# Patient Record
Sex: MALE | Race: WHITE
[De-identification: names, ages, dates, MRNs, and addresses within clinical notes are randomized per-mention and may not be internally consistent; named-entity substitution may affect disease eponyms.]

---

## 2017-08-22 ENCOUNTER — HOSPITAL ENCOUNTER (EMERGENCY)
Dept: HOSPITAL 10 - E/R | Age: 77
Discharge: ANOTHER HOSPITAL | End: 2017-08-22
Payer: COMMERCIAL

## 2017-08-22 VITALS
HEIGHT: 67 IN | WEIGHT: 158.31 LBS | BODY MASS INDEX: 24.85 KG/M2 | BODY MASS INDEX: 24.85 KG/M2 | HEIGHT: 67 IN | WEIGHT: 158.31 LBS

## 2017-08-22 VITALS
RESPIRATION RATE: 18 BRPM | HEART RATE: 102 BPM | SYSTOLIC BLOOD PRESSURE: 126 MMHG | DIASTOLIC BLOOD PRESSURE: 81 MMHG | TEMPERATURE: 97.3 F

## 2017-08-22 DIAGNOSIS — Z87.891: ICD-10-CM

## 2017-08-22 DIAGNOSIS — Z79.84: ICD-10-CM

## 2017-08-22 DIAGNOSIS — I10: ICD-10-CM

## 2017-08-22 DIAGNOSIS — Z79.4: ICD-10-CM

## 2017-08-22 DIAGNOSIS — E11.9: ICD-10-CM

## 2017-08-22 DIAGNOSIS — I21.4: Primary | ICD-10-CM

## 2017-08-22 LAB
ALBUMIN SERPL-MCNC: 3.9 G/DL (ref 3.3–4.9)
ALBUMIN/GLOB SERPL: 1.14 {RATIO}
ALP SERPL-CCNC: 84 IU/L (ref 42–121)
ALT SERPL-CCNC: 29 IU/L (ref 13–69)
ANION GAP SERPL CALC-SCNC: 15 MMOL/L (ref 8–16)
APTT BLD: 26.9 SEC (ref 25–35)
AST SERPL-CCNC: 30 IU/L (ref 15–46)
BASOPHILS # BLD AUTO: 0.1 10^3/UL (ref 0–0.1)
BASOPHILS NFR BLD: 0.5 % (ref 0–2)
BILIRUB DIRECT SERPL-MCNC: 0 MG/DL (ref 0–0.2)
BILIRUB SERPL-MCNC: 0.4 MG/DL (ref 0.2–1.3)
BUN SERPL-MCNC: 19 MG/DL (ref 7–20)
CALCIUM SERPL-MCNC: 8.6 MG/DL (ref 8.4–10.2)
CHLORIDE SERPL-SCNC: 108 MMOL/L (ref 97–110)
CO2 SERPL-SCNC: 20 MMOL/L (ref 21–31)
CREAT SERPL-MCNC: 1.01 MG/DL (ref 0.61–1.24)
EOSINOPHIL # BLD: 0.8 10^3/UL (ref 0–0.5)
EOSINOPHIL NFR BLD: 7.5 % (ref 0–7)
ERYTHROCYTE [DISTWIDTH] IN BLOOD BY AUTOMATED COUNT: 13.2 % (ref 11.5–14.5)
GLOBULIN SER-MCNC: 3.4 G/DL (ref 1.3–3.2)
GLUCOSE SERPL-MCNC: 243 MG/DL (ref 70–220)
HCT VFR BLD CALC: 38.7 % (ref 42–52)
HGB BLD-MCNC: 13.2 G/DL (ref 14–18)
INR PPP: 0.94
LYMPHOCYTES # BLD AUTO: 3 10^3/UL (ref 0.8–2.9)
LYMPHOCYTES NFR BLD AUTO: 28.7 % (ref 15–51)
MCH RBC QN AUTO: 32.6 PG (ref 29–33)
MCHC RBC AUTO-ENTMCNC: 34.1 G/DL (ref 32–37)
MCV RBC AUTO: 95.6 FL (ref 82–101)
MONOCYTES # BLD: 0.5 10^3/UL (ref 0.3–0.9)
MONOCYTES NFR BLD: 4.3 % (ref 0–11)
NEUTROPHILS NFR BLD AUTO: 58.7 % (ref 39–77)
NRBC # BLD MANUAL: 0 10^3/UL (ref 0–0)
NRBC BLD AUTO-RTO: 0 /100WBC (ref 0–0)
PLATELET # BLD: 249 10^3/UL (ref 140–415)
PMV BLD AUTO: 12.2 FL (ref 7.4–10.4)
POTASSIUM SERPL-SCNC: 4.2 MMOL/L (ref 3.5–5.1)
PROT SERPL-MCNC: 7.3 G/DL (ref 6.1–8.1)
PROTHROMBIN TIME: 12.6 SEC (ref 12.2–14.2)
PT RATIO: 1
RBC # BLD AUTO: 4.05 10^6/UL (ref 4.7–6.1)
SODIUM SERPL-SCNC: 139 MMOL/L (ref 135–144)
TROPONIN I SERPL-MCNC: 1.06 NG/ML (ref 0–0.12)
WBC # BLD AUTO: 10.5 10^3/UL (ref 4.8–10.8)

## 2017-08-22 PROCEDURE — 85610 PROTHROMBIN TIME: CPT

## 2017-08-22 PROCEDURE — 94664 DEMO&/EVAL PT USE INHALER: CPT

## 2017-08-22 PROCEDURE — 85025 COMPLETE CBC W/AUTO DIFF WBC: CPT

## 2017-08-22 PROCEDURE — 83605 ASSAY OF LACTIC ACID: CPT

## 2017-08-22 PROCEDURE — 87040 BLOOD CULTURE FOR BACTERIA: CPT

## 2017-08-22 PROCEDURE — 85730 THROMBOPLASTIN TIME PARTIAL: CPT

## 2017-08-22 PROCEDURE — 99285 EMERGENCY DEPT VISIT HI MDM: CPT

## 2017-08-22 PROCEDURE — 93005 ELECTROCARDIOGRAM TRACING: CPT

## 2017-08-22 PROCEDURE — 83880 ASSAY OF NATRIURETIC PEPTIDE: CPT

## 2017-08-22 PROCEDURE — 71010: CPT

## 2017-08-22 PROCEDURE — 80053 COMPREHEN METABOLIC PANEL: CPT

## 2017-08-22 PROCEDURE — 84484 ASSAY OF TROPONIN QUANT: CPT

## 2017-08-22 PROCEDURE — 96374 THER/PROPH/DIAG INJ IV PUSH: CPT

## 2017-08-22 NOTE — RADRPT
PROCEDURE:   XR Chest. 

 

CLINICAL INDICATION:   Sepsis 

 

TECHNIQUE:   Portable single view of the chest

 

COMPARISON:   None. 

 

FINDINGS:

There is mild cardiomegaly.  Lung volumes are reduced.  There is pulmonary vascular congestion as we
ll as peribronchial thickening.  Areas of scar or subsegmental atelectasis are seen throughout the l
karson fields bilaterally.  Increased left retrocardiac opacity may be due to cardiomegaly or underlyin
g airspace disease.  No pleural effusion is seen.  Degenerative change of the spine and shoulders.  
Atherosclerotic aorta. 

 

IMPRESSION:

Cardiomegaly.  Slightly increased left retrocardiac opacity may all be due to cardiomegaly although 
underlying airspace disease cannot be completely excluded.  Lateral view could be obtained if indica
damaris clinically. Mild pulmonary vascular congestion and peribronchial thickening.

 

RPTAT: HLBE

_____________________________________________ 

Physician Hossein           Date    Time 

Electronically viewed and signed by Physician Hossein on 08/22/2017 02:56 

 

D:  08/22/2017 02:56  T:  08/22/2017 02:56

CHUY/

## 2017-08-22 NOTE — ERA
ER Documentation


Chief Complaint


Date/Time


DATE: 8/22/17 


TIME: 05:23


Chief Complaint


BIB [facility] FR HOME, C/O SOB COUGH/CONGESTION [id number]





HPI


-year-old male brought in from home with complaints of shortness of breath and 

cough and chest pain.  Patient in by ambulance.  Chest pain is very mild.  

Shortness breath.  Progressively worse over the last 2 days.  No nausea no 

vomiting no chills.  No other current complaints.  Patient is a [facility] patient





ROS


All systems reviewed and are negative except as per history of present illness.





Medications


Home Meds


Reported Medications


Glipizide* (Glipizide*) 5 Mg Tablet, 5 MG PO AC BREAKFAST DINNER, TAB


   8/22/17


Metformin* (Glucophage*) 500 Mg Tab, 500 MG PO WITH BREAKFAST DINNE, #30 TAB


   8/22/17


Discontinued Reported Medications


Insulin Aspart (Novolog) 100 Unit/1 Ml Cartridge, 15 UNIT SQ Q9PM


   8/22/17





Allergies


Allergies:  


Coded Allergies:  


     No Known Allergy (Unverified , 8/22/17)





PMhx/Soc


Medical and Surgical Hx:  pt denies Surgical Hx


History of Surgery:  No


Anesthesia Reaction:  No


Hx Neurological Disorder:  No


Hx Respiratory Disorders:  No


Hx Cardiac Disorders:  Yes (htn, diabetes )


Hx Psychiatric Problems:  No


Hx Miscellaneous Medical Probl:  No


Hx Alcohol Use:  No


Hx Substance Use:  No


Hx Tobacco Use:  No


Smoking Status:  Former smoker





Physical Exam


Vitals





Vital Signs








  Date Time  Temp Pulse Resp B/P Pulse Ox O2 Delivery O2 Flow Rate FiO2


 


8/22/17 05:17 97.3 102 18 126/81 100 Nasal Cannula 2.0 


 


8/22/17 03:37     100  2.0 28


 


8/22/17 03:28  108 17  100 Nasal Cannula 2.0 28


 


8/22/17 03:20 97.3 104 19 106/68 99 Nasal Cannula 2.0 


 


8/22/17 02:14      Nasal Cannula 2 


 


8/22/17 02:14      Nasal Cannula 2.0 


 


8/22/17 02:09 97.3 122 23 111/93 90   








Physical Exam


Const:  []


Head:   Atraumatic 


Eyes:    Normal Conjunctiva


ENT:    Normal External Ears, Nose and Mouth.


Neck:               Full range of motion..~ No meningismus.


Resp:    Clear to auscultation bilaterally


Cardio:    Regular rate and rhythm, no murmurs


Abd:    Soft, non tender, non distended. Normal bowel sounds


Skin:    No petechiae or rashes


Back:    No midline or flank tenderness


Ext:    No cyanosis, or edema


Neur:    Awake and alert


Psych:    Normal Mood and Affect


Result Diagram:  


8/22/17 0224 8/22/17 0224





Results 24 hrs





 Laboratory Tests








Test


  8/22/17


02:24 8/22/17


02:28


 


White Blood Count 10.510^3/ul  


 


Red Blood Count 4.0510^6/ul  


 


Hemoglobin 13.2g/dl  


 


Hematocrit 38.7%  


 


Mean Corpuscular Volume 95.6fl  


 


Mean Corpuscular Hemoglobin 32.6pg  


 


Mean Corpuscular Hemoglobin


Concent 34.1g/dl 


  


 


 


Red Cell Distribution Width 13.2%  


 


Platelet Count 50509^3/UL  


 


Mean Platelet Volume 12.2fl  


 


Neutrophils % 58.7%  


 


Lymphocytes % 28.7%  


 


Monocytes % 4.3%  


 


Eosinophils % 7.5%  


 


Basophils % 0.5%  


 


Nucleated Red Blood Cells % 0.0/100WBC  


 


Neutrophils # (Manual) 610^3/ul  


 


Lymphocytes # 3.010^3/ul  


 


Monocytes # 0.510^3/ul  


 


Eosinophils # 0.810^3/ul  


 


Basophils # 0.110^3/ul  


 


Nucleated Red Blood Cells # 0.010^3/ul  


 


Prothrombin Time 12.6Sec  


 


Prothrombin Time Ratio 1.0  


 


INR International Normalized


Ratio 0.94 


  


 


 


Activated Partial


Thromboplast Time 26.9Sec 


  


 


 


Sodium Level 139mmol/L  


 


Potassium Level 4.2mmol/L  


 


Chloride Level 108mmol/L  


 


Carbon Dioxide Level 20mmol/L  


 


Anion Gap 15  


 


Blood Urea Nitrogen 19mg/dl  


 


Creatinine 1.01mg/dl  


 


Glucose Level 243mg/dl  


 


Calcium Level 8.6mg/dl  


 


Total Bilirubin 0.4mg/dl  


 


Direct Bilirubin 0.00mg/dl  


 


Indirect Bilirubin 0.4mg/dl  


 


Aspartate Amino Transf


(AST/SGOT) 30IU/L 


  


 


 


Alanine Aminotransferase


(ALT/SGPT) 29IU/L 


  


 


 


Alkaline Phosphatase 84IU/L  


 


Troponin I 1.060ng/ml  


 


B-Type Natriuretic Peptide 6310PG/ML  


 


Total Protein 7.3g/dl  


 


Albumin 3.9g/dl  


 


Globulin 3.40g/dl  


 


Albumin/Globulin Ratio 1.14  


 


Lactic Acid Level  1.9mmol/L 








 Current Medications








 Medications


  (Trade)  Dose


 Ordered  Sig/Anabel


 Route


 PRN Reason  Start Time


 Stop Time Status Last Admin


Dose Admin


 


 Albuterol


  (Proventil


 0.083% (Neb))  5 mg  ONCE  STAT


 NEB


   8/22/17 02:51


 8/22/17 02:52 DC 8/22/17 03:28


 


 


 Ipratropium


 Bromide


  (Atrovent 0.02%


  (Neb))  0.5 mg  ONCE  STAT


 NEB


   8/22/17 02:51


 8/22/17 02:52 DC 8/22/17 03:27


 


 


 Aspirin


  (Aspirin)  325 mg  ONCE  ONCE


 PO


   8/22/17 04:00


 8/22/17 04:01 DC 8/22/17 03:53


 


 


 Furosemide


  (Lasix)  40 mg  ONCE  ONCE


 IV


   8/22/17 05:30


 8/22/17 05:31   


 


 


 Nitroglycerin


  (Nitroglycerin


 2% Oint)  1 inch  ONCE  ONCE


 TD


   8/22/17 05:30


 8/22/17 05:31   


 











Procedures/MDM


EKG: 


Rate/Rhythm:             Normal Sinus Rhythm


QRS, ST, T-waves:    No changes consistent w/ acute ischemia


Impression:      No evidence of ischemia or arrhythmia





Chest X-ray 1V Interpreted by me:


Soft Tissue:                                               No acute 

abnormalities


Bones:                                                    No acute abnormalities


Mediastinum/Cardiac Silhouette/Lungs:     Cardiomegaly with increased 

interstitial fluid markings





Patient's symptoms are concerning for cardiac cause will require inpatient 

workup and continuous monitoring.  Further w/u for ischemia, arrhythmia, PE or 

dissection will be deferred to the inpatient team.  Elevation of troponin and 

absence of ST segment elevation myocardial infarction makes is a subacute 

cardiac injury.  Patient will be transferred to [place] for further evaluation 

and management via paramedic transport





Accepting Care Team:


Current data and ongoing care discussed.


Time:                      5 AM


Primary Provider:      Transferred to [place]


Consulting:                  [[email or web address]]


Outstanding Data:       none





Departure


Diagnosis:  


 Primary Impression:  


 Non-STEMI (non-ST elevated myocardial infarction)


Condition:  Stable











CARIE DEVI Aug 22, 2017 05:25

## 2017-09-15 ENCOUNTER — HOSPITAL ENCOUNTER (EMERGENCY)
Dept: HOSPITAL 10 - E/R | Age: 77
Discharge: HOME | End: 2017-09-15
Payer: COMMERCIAL

## 2017-09-15 VITALS
HEIGHT: 67 IN | BODY MASS INDEX: 21.11 KG/M2 | WEIGHT: 134.48 LBS | BODY MASS INDEX: 21.11 KG/M2 | WEIGHT: 134.48 LBS | HEIGHT: 67 IN

## 2017-09-15 VITALS — DIASTOLIC BLOOD PRESSURE: 85 MMHG | SYSTOLIC BLOOD PRESSURE: 129 MMHG | HEART RATE: 70 BPM | RESPIRATION RATE: 16 BRPM

## 2017-09-15 VITALS — TEMPERATURE: 98.1 F

## 2017-09-15 DIAGNOSIS — Z79.84: ICD-10-CM

## 2017-09-15 DIAGNOSIS — D64.9: ICD-10-CM

## 2017-09-15 DIAGNOSIS — R51: Primary | ICD-10-CM

## 2017-09-15 DIAGNOSIS — Z79.01: ICD-10-CM

## 2017-09-15 DIAGNOSIS — E11.9: ICD-10-CM

## 2017-09-15 DIAGNOSIS — E86.0: ICD-10-CM

## 2017-09-15 DIAGNOSIS — I10: ICD-10-CM

## 2017-09-15 DIAGNOSIS — Z79.82: ICD-10-CM

## 2017-09-15 LAB
ANION GAP SERPL CALC-SCNC: 14 MMOL/L (ref 8–16)
APTT BLD: 28.5 SEC (ref 25–35)
BASOPHILS # BLD AUTO: 0 10^3/UL (ref 0–0.1)
BASOPHILS NFR BLD: 0.5 % (ref 0–2)
BUN SERPL-MCNC: 29 MG/DL (ref 7–20)
CALCIUM SERPL-MCNC: 9.5 MG/DL (ref 8.4–10.2)
CHLORIDE SERPL-SCNC: 104 MMOL/L (ref 97–110)
CO2 SERPL-SCNC: 25 MMOL/L (ref 21–31)
CREAT SERPL-MCNC: 1.2 MG/DL (ref 0.61–1.24)
EOSINOPHIL # BLD: 0.4 10^3/UL (ref 0–0.5)
EOSINOPHIL NFR BLD: 6.2 % (ref 0–7)
ERYTHROCYTE [DISTWIDTH] IN BLOOD BY AUTOMATED COUNT: 12.1 % (ref 11.5–14.5)
GLUCOSE SERPL-MCNC: 152 MG/DL (ref 70–220)
HCT VFR BLD CALC: 38.5 % (ref 42–52)
HGB BLD-MCNC: 13 G/DL (ref 14–18)
INR PPP: 0.93
LYMPHOCYTES # BLD AUTO: 1.4 10^3/UL (ref 0.8–2.9)
LYMPHOCYTES NFR BLD AUTO: 22.5 % (ref 15–51)
MCH RBC QN AUTO: 32.3 PG (ref 29–33)
MCHC RBC AUTO-ENTMCNC: 33.8 G/DL (ref 32–37)
MCV RBC AUTO: 95.8 FL (ref 82–101)
MONOCYTES # BLD: 0.6 10^3/UL (ref 0.3–0.9)
MONOCYTES NFR BLD: 8.9 % (ref 0–11)
NEUTROPHILS # BLD: 3.9 10^3/UL (ref 1.6–7.5)
NEUTROPHILS NFR BLD AUTO: 61.7 % (ref 39–77)
NRBC # BLD MANUAL: 0 10^3/UL (ref 0–0)
NRBC BLD AUTO-RTO: 0 /100WBC (ref 0–0)
PLATELET # BLD: 191 10^3/UL (ref 140–415)
PMV BLD AUTO: 11.2 FL (ref 7.4–10.4)
POTASSIUM SERPL-SCNC: 4.5 MMOL/L (ref 3.5–5.1)
PROTHROMBIN TIME: 12.5 SEC (ref 12.2–14.2)
PT RATIO: 1
RBC # BLD AUTO: 4.02 10^6/UL (ref 4.7–6.1)
SODIUM SERPL-SCNC: 138 MMOL/L (ref 135–144)
WBC # BLD AUTO: 6.3 10^3/UL (ref 4.8–10.8)

## 2017-09-15 PROCEDURE — 85610 PROTHROMBIN TIME: CPT

## 2017-09-15 PROCEDURE — 80048 BASIC METABOLIC PNL TOTAL CA: CPT

## 2017-09-15 PROCEDURE — 85025 COMPLETE CBC W/AUTO DIFF WBC: CPT

## 2017-09-15 PROCEDURE — 99285 EMERGENCY DEPT VISIT HI MDM: CPT

## 2017-09-15 PROCEDURE — 70450 CT HEAD/BRAIN W/O DYE: CPT

## 2017-09-15 PROCEDURE — 36415 COLL VENOUS BLD VENIPUNCTURE: CPT

## 2017-09-15 PROCEDURE — 85730 THROMBOPLASTIN TIME PARTIAL: CPT

## 2017-09-15 NOTE — ERA
ER Documentation


Chief Complaint


Date/Time


DATE: 9/15/17


Chief Complaint


BIB [facility] FROM HOME FOR HEADACHE AND DIZZINESS X 1 DAY, PT CANNOT SLEEP





HPI


The patient is a 77-year-old male, presenting with headache, dizziness 

intermittently for 2 weeks, worse tonight.  He denies vertigo, he has similar 

symptoms previously, denies fever, chills, neck pain, chest pain, abdominal pain

, vomiting, dysuria, diarrhea.  He does not smoke nor drink, has a lot of 

stress in his life, lives alone





Past medical history: Hypertension, diabetes mellitus, dyslipidemia, depression





Past surgical history: None





ROS


All systems reviewed and are negative except as per history of present illness.





Medications


Home Meds


Reported Medications


Bisoprolol Fumarate* (Bisoprolol Fumarate*) 5 Mg Tablet, 5 MG PO DAILY, TAB


   9/15/17


Lisinopril* (Lisinopril*) 5 Mg Tablet, 5 MG PO DAILY, #30 TAB


   9/15/17


Aspirin* (Aspirin* EC) 81 Mg Tablet.dr, 81 MG PO DAILY, TAB


   9/15/17


Clopidogrel Bisulfate* (Clopidogrel Bisulfate*) 75 Mg Tablet, 75 MG PO DAILY, #

30 TAB


   9/15/17


Furosemide* (Furosemide*) 40 Mg Tablet, 40 MG PO DAILY, TAB


   9/15/17


Atorvastatin* (Atorvastatin*) 40 Mg Tablet, 40 MG PO QPM, #30 TAB


   9/15/17


Discontinued Reported Medications


Glipizide* (Glipizide*) 5 Mg Tablet, 5 MG PO AC BREAKFAST DINNER, TAB


   17


Metformin* (Glucophage*) 500 Mg Tab, 500 MG PO WITH BREAKFAST DINNE, #30 TAB


   17





Allergies


Allergies:  


Coded Allergies:  


     No Known Allergy (Unverified , 9/15/17)





PMhx/Soc


History of Surgery:  No


Anesthesia Reaction:  No


Hx Neurological Disorder:  No


Hx Respiratory Disorders:  No


Hx Cardiac Disorders:  Yes (htn, diabetes )


Hx Psychiatric Problems:  No


Hx Miscellaneous Medical Probl:  No


Hx Alcohol Use:  No


Hx Substance Use:  No


Hx Tobacco Use:  No


Smoking Status:  Never smoker





Physical Exam


Vitals





Vital Signs








  Date Time  Temp Pulse Resp B/P Pulse Ox O2 Delivery O2 Flow Rate FiO2


 


9/15/17 06:00  70 16 129/85 100 Room Air  


 


9/15/17 05:04  68 16 116/67 100 Room Air  


 


9/15/17 02:02 98.1 81 18 142/81 96 Room Air  


 


9/15/17 01:58 98.6 80 18 145/84 96   








Physical Exam


 Const:      No acute distress.


 Head:        Atraumatic.


 Eyes:       Normal Conjunctiva.


 ENT:         Normal External Ears, Nose and Mouth.


 Neck:        Full range of motion.  No meningismus.


 Resp:         Clear to auscultation bilaterally.


 Cardio:       Regular rate and rhythm.


 Abd:         Soft,  non distended, normal bowel sounds, non tender.


 Skin:         No petechiae or rashes.


 Back:        No midline or flank tenderness.


 Ext:          No cyanosis, or edema.


 Neur:        Awake and alert. No focal deficit


 Psych:        Normal Mood and Affect.


Result Diagram:  


9/15/17 0233                                                                   

             9/15/17 0233





Results 24 hrs





 Laboratory Tests








Test


  9/15/17


02:33


 


White Blood Count 6.310^3/ul 


 


Red Blood Count 4.0210^6/ul 


 


Hemoglobin 13.0g/dl 


 


Hematocrit 38.5% 


 


Mean Corpuscular Volume 95.8fl 


 


Mean Corpuscular Hemoglobin 32.3pg 


 


Mean Corpuscular Hemoglobin


Concent 33.8g/dl 


 


 


Red Cell Distribution Width 12.1% 


 


Platelet Count 96230^3/UL 


 


Mean Platelet Volume 11.2fl 


 


Neutrophils % 61.7% 


 


Lymphocytes % 22.5% 


 


Monocytes % 8.9% 


 


Eosinophils % 6.2% 


 


Basophils % 0.5% 


 


Nucleated Red Blood Cells % 0.0/100WBC 


 


Neutrophils # 3.910^3/ul 


 


Lymphocytes # 1.410^3/ul 


 


Monocytes # 0.610^3/ul 


 


Eosinophils # 0.410^3/ul 


 


Basophils # 0.010^3/ul 


 


Nucleated Red Blood Cells # 0.010^3/ul 


 


Prothrombin Time 12.5Sec 


 


Prothrombin Time Ratio 1.0 


 


INR International Normalized


Ratio 0.93 


 


 


Activated Partial


Thromboplast Time 28.5Sec 


 


 


Sodium Level 138mmol/L 


 


Potassium Level 4.5mmol/L 


 


Chloride Level 104mmol/L 


 


Carbon Dioxide Level 25mmol/L 


 


Anion Gap 14 


 


Blood Urea Nitrogen 29mg/dl 


 


Creatinine 1.20mg/dl 


 


Glucose Level 152mg/dl 


 


Calcium Level 9.5mg/dl 








 Current Medications








 Medications


  (Trade)  Dose


 Ordered  Sig/Anabel


 Route


 PRN Reason  Start Time


 Stop Time Status Last Admin


Dose Admin


 


 Sodium Chloride


  (NS)  500 ml @ 


 500 mls/hr  Q1H ONCE


 IV


   9/15/17 04:30


 9/15/17 05:29 DC 9/15/17 04:53


 











Procedures/MDM





 [place] [id number]


 Radiology Main Line: [phone]





 DIAGNOSTIC IMAGING REPORT





 Patient: JE ROBINS   : 1940   Age: 77  Sex: M                     

   


 MR #:    [id number]   Acct #:   [id number]    DOS: 09/15/17 0210


 Ordering MD: MALDONADO AYALA MD   Location:  E/R   Room/Bed:                  

                          


 








PROCEDURE:    CT BRAIN WITHOUT CONTRAST  


 


CLINICAL INDICATION:   77-year-old male with headaches. 


 


TECHNIQUE:   The study was performed utilizing [employer] VCT 64-slice CT 

scanner.  Direct axial sections were obtained from the foramen magnum to the 

vertex without the use of intravenous contrast material. Sagittal and coronal 

reformations were obtained. One or more the following dose reduction techniques 

were utilized: automated exposure control, adjustment of the mA and/or kV 

according to patient's size or use of iterative reconstruction technique. The 

images were viewed on a PACS workstation. CTD/vol = 45.0 mGy; Total Exam DLP = 

720.2 mGy-cm.


 


COMPARISON:   None. 


 


FINDINGS:


 


There is mild degree of diffuse cortical and central atrophy with compensatory 

ventricular enlargement.  There is no evidence for mass effect or midline 

shift.  There are periventricular and deep white matter areas of decreased 

density consistent with microangiopathic ischemic changes. There is focal right 

parietal encephalomalacia most suggestive of a prior watershed infarct. There 

is no evidence for acute intra or extra-axial blood. Calcifications are seen 

within the intracranial carotid and vertebral arteries bilaterally. The bony 

calvarium is intact. There is minimal mucosal thickening within the ethmoid air 

cells bilaterally. No air-fluid levels are noted. The mastoid air cells are 

without significant soft tissue.


 


IMPRESSION:


 


1.  Mild diffuse atrophy.


2.  Microangiopathic ischemic changes.


3.  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


4.  Vascular calcifications. 


5.  Minimal mucosal thickening ethmoid air cells.


_____________________________________________ 


.Michael Gerardo MD, MD           Date    Time 


Electronically viewed and signed by .Michael Gerardo MD, MD on 09/15/2017 03:43 


 


D:  09/15/2017 03:43  T:  09/15/2017 03:43


.M/





CC: MALDONADO AYALA MD


\


MEDICAL MAKING DECISION: The patient is a 77-year-old male, presenting with 

acute dizziness of unclear etiology, acute dehydration.  He was treated with 

normal saline 500 mL with good response





The differential diagnoses considered include but are not limited to central 

causes such as cerebellar infarct, cerebellar hemorrhage, cerebellar tumor, 

acoustic neuroma, peripheral causes such as benign positional vertigo, 

labyrinthitis, medication, Meniere's disease.





Departure


Diagnosis:  


 Primary Impression:  


 Dizziness


 Additional Impressions:  


 Dehydration


 Anemia


Condition:  Good


Patient Instructions:  Dizziness, Unk Cause





Additional Instructions:  


Call your primary care doctor TOMORROW for an appointment during the next 1-2 

days.See the doctor sooner or return here if your condition worsens before your 

appointment time.











MALDONADO AYALA MD Sep 15, 2017 23:01

## 2017-09-15 NOTE — RADRPT
PROCEDURE:    CT BRAIN WITHOUT CONTRAST  

 

CLINICAL INDICATION:   77-year-old male with headaches. 

 

TECHNIQUE:   The study was performed utilizing [employer] VCT 64-slice CT scanner.  Direct axial 
sections were obtained from the foramen magnum to the vertex without the use of intravenous contrast
 material. Sagittal and coronal reformations were obtained. One or more the following dose reduction
 techniques were utilized: automated exposure control, adjustment of the mA and/or kV according to p
atient's size or use of iterative reconstruction technique. The images were viewed on a PACS worksta
tion. CTD/vol = 45.0 mGy; Total Exam DLP = 720.2 mGy-cm.

 

COMPARISON:   None. 

 

FINDINGS:

 

There is mild degree of diffuse cortical and central atrophy with compensatory ventricular enlargeme
nt.  There is no evidence for mass effect or midline shift.  There are periventricular and deep whit
e matter areas of decreased density consistent with microangiopathic ischemic changes. There is foca
l right parietal encephalomalacia most suggestive of a prior watershed infarct. There is no evidence
 for acute intra or extra-axial blood. Calcifications are seen within the intracranial carotid and v
ertebral arteries bilaterally. The bony calvarium is intact. There is minimal mucosal thickening wit
hin the ethmoid air cells bilaterally. No air-fluid levels are noted. The mastoid air cells are with
out significant soft tissue.

 

IMPRESSION:

 

1.  Mild diffuse atrophy.

2.  Microangiopathic ischemic changes.

3.  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

4.  Vascular calcifications. 

5.  Minimal mucosal thickening ethmoid air cells.

_____________________________________________ 

.Michael Gerardo MD, MD           Date    Time 

Electronically viewed and signed by .Michael Gerardo MD, MD on 09/15/2017 03:43 

 

D:  09/15/2017 03:43  T:  09/15/2017 03:43

.KELLI

## 2017-10-29 ENCOUNTER — HOSPITAL ENCOUNTER (EMERGENCY)
Dept: HOSPITAL 10 - E/R | Age: 77
Discharge: ANOTHER HOSPITAL | End: 2017-10-29
Payer: COMMERCIAL

## 2017-10-29 VITALS
HEIGHT: 67 IN | BODY MASS INDEX: 25.95 KG/M2 | WEIGHT: 165.35 LBS | WEIGHT: 165.35 LBS | HEIGHT: 67 IN | BODY MASS INDEX: 25.95 KG/M2

## 2017-10-29 VITALS
HEART RATE: 90 BPM | DIASTOLIC BLOOD PRESSURE: 77 MMHG | TEMPERATURE: 98.4 F | SYSTOLIC BLOOD PRESSURE: 118 MMHG | RESPIRATION RATE: 14 BRPM

## 2017-10-29 DIAGNOSIS — J96.90: Primary | ICD-10-CM

## 2017-10-29 DIAGNOSIS — E11.65: ICD-10-CM

## 2017-10-29 DIAGNOSIS — I50.9: ICD-10-CM

## 2017-10-29 DIAGNOSIS — J81.0: ICD-10-CM

## 2017-10-29 DIAGNOSIS — I10: ICD-10-CM

## 2017-10-29 LAB
ANION GAP SERPL CALC-SCNC: 20 MMOL/L (ref 8–16)
ARTERIAL COHB: 0.7 % (ref 0–3)
ARTERIAL FRACTION OF OXYHGB: 98.1 % (ref 93–99)
ARTERIAL METHB: 0.4 % (ref 0–1.5)
ARTERIAL PATENCY WRIST A: (no result)
ARTERIAL TOTAL HEMGLOBIN: 14.5 G/DL (ref 12–18)
BASE EXCESS BLDA CALC-SCNC: -4.8 MMOL/L (ref -3–3)
BASOPHILS # BLD AUTO: 0.1 10^3/UL (ref 0–0.1)
BASOPHILS NFR BLD: 0.7 % (ref 0–2)
BLOOD GAS IEPAP: (no result)
BLOOD GAS PS: 12
BUN SERPL-MCNC: 24 MG/DL (ref 7–20)
CALCIUM SERPL-MCNC: 9.3 MG/DL (ref 8.4–10.2)
CHLORIDE SERPL-SCNC: 104 MMOL/L (ref 97–110)
CO2 SERPL-SCNC: 22 MMOL/L (ref 21–31)
CREAT SERPL-MCNC: 1.41 MG/DL (ref 0.61–1.24)
EOSINOPHIL # BLD: 0.6 10^3/UL (ref 0–0.5)
EOSINOPHIL NFR BLD: 5.3 % (ref 0–7)
ERYTHROCYTE [DISTWIDTH] IN BLOOD BY AUTOMATED COUNT: 13.3 % (ref 11.5–14.5)
GLUCOSE SERPL-MCNC: 439 MG/DL (ref 70–220)
HCO3 BLDA-SCNC: 20.3 MMOL/L (ref 22–26)
HCT VFR BLD CALC: 44.7 % (ref 42–52)
HGB BLD-MCNC: 14.1 G/DL (ref 14–18)
LYMPHOCYTES # BLD AUTO: 4.8 10^3/UL (ref 0.8–2.9)
LYMPHOCYTES NFR BLD AUTO: 40.6 % (ref 15–51)
MCH RBC QN AUTO: 31.3 PG (ref 29–33)
MCHC RBC AUTO-ENTMCNC: 31.5 G/DL (ref 32–37)
MCV RBC AUTO: 99.1 FL (ref 82–101)
MODE: (no result)
MONOCYTES # BLD: 0.7 10^3/UL (ref 0.3–0.9)
MONOCYTES NFR BLD: 6.1 % (ref 0–11)
NEUTROPHILS # BLD: 5.5 10^3/UL (ref 1.6–7.5)
NEUTROPHILS NFR BLD AUTO: 47.1 % (ref 39–77)
NRBC # BLD MANUAL: 0 10^3/UL (ref 0–0)
NRBC BLD AUTO-RTO: 0 /100WBC (ref 0–0)
O2 A-A PPRESDIFF RESPIRATORY: 198.9 MMHG (ref 7–24)
O2/TOTAL GAS SETTING VFR VENT: 60 %
PLATELET # BLD: 275 10^3/UL (ref 140–415)
PMV BLD AUTO: 11.2 FL (ref 7.4–10.4)
POTASSIUM SERPL-SCNC: 3.5 MMOL/L (ref 3.5–5.1)
RBC # BLD AUTO: 4.51 10^6/UL (ref 4.7–6.1)
SODIUM SERPL-SCNC: 142 MMOL/L (ref 135–144)
TROPONIN I SERPL-MCNC: 0.05 NG/ML (ref 0–0.12)
WBC # BLD AUTO: 11.8 10^3/UL (ref 4.8–10.8)

## 2017-10-29 PROCEDURE — 36600 WITHDRAWAL OF ARTERIAL BLOOD: CPT

## 2017-10-29 PROCEDURE — 96374 THER/PROPH/DIAG INJ IV PUSH: CPT

## 2017-10-29 PROCEDURE — 85025 COMPLETE CBC W/AUTO DIFF WBC: CPT

## 2017-10-29 PROCEDURE — 82962 GLUCOSE BLOOD TEST: CPT

## 2017-10-29 PROCEDURE — 84484 ASSAY OF TROPONIN QUANT: CPT

## 2017-10-29 PROCEDURE — 94644 CONT INHLJ TX 1ST HOUR: CPT

## 2017-10-29 PROCEDURE — 96375 TX/PRO/DX INJ NEW DRUG ADDON: CPT

## 2017-10-29 PROCEDURE — 36415 COLL VENOUS BLD VENIPUNCTURE: CPT

## 2017-10-29 PROCEDURE — 71010: CPT

## 2017-10-29 PROCEDURE — 83880 ASSAY OF NATRIURETIC PEPTIDE: CPT

## 2017-10-29 PROCEDURE — 99291 CRITICAL CARE FIRST HOUR: CPT

## 2017-10-29 PROCEDURE — 93005 ELECTROCARDIOGRAM TRACING: CPT

## 2017-10-29 PROCEDURE — 94660 CPAP INITIATION&MGMT: CPT

## 2017-10-29 PROCEDURE — 80048 BASIC METABOLIC PNL TOTAL CA: CPT

## 2017-10-29 PROCEDURE — 82803 BLOOD GASES ANY COMBINATION: CPT

## 2017-10-29 PROCEDURE — 96372 THER/PROPH/DIAG INJ SC/IM: CPT

## 2017-10-29 NOTE — RADRPT
PROCEDURE:   XR Chest. 

 

CLINICAL INDICATION:   Chest pain.

 

TECHNIQUE:   Single frontal view of the chest.

 

COMPARISON:   None. 

 

FINDINGS:

Mild cardiomegaly. Bilateral patchy air space disease with nodular features. Recommend follow-up to 
resolution. Left lung base discoid atelectasis. signs of pleural fluid or pneumothorax are seen. The
 osseous structures and soft tissues are unremarkable. 

 

IMPRESSION:

1. Bilateral patchy air space disease compatible with bilateral pneumonias.

2. Underlying nodular features in the bilateral air space disease, and recommend follow-up to resolu
tion.

3. CT examination would be more sensitive and specific for evaluating lung parenchyma.

 

RPTAT: UU

_____________________________________________ 

Physician Kamari           Date    Time 

Electronically viewed and signed by Physician Kamari on 10/29/2017 02:15 

 

D:  10/29/2017 02:15  T:  10/29/2017 02:15

RS/

## 2017-10-29 NOTE — ERD
ER Documentation


Chief Complaint


Chief Complaint


severe resp distress, bib ra on cpap; hx unk





HPI


77-year-old male was brought in and severe respiratory distress on CPAP.  

Patient himself had called paramedics in respiratory distress.  They had 

arrived the patient was no longer able to talk because he was having such 

difficulty breathing.  They are unable to obtain any information including his 

name.  





Patient was immediately placed on BiPAP after which improved somewhat a few 

minutes later and when asking him in [place] patient was able to answer yes/no 

questions.  Denies chest pain, confirms severe shortness of breath that started 

today.  No further history is possible yet.





ROS


Unobtainable





Allergies


Allergies:  


Coded Allergies:  


     No Known Allergy (Unverified , 10/29/17)





PMhx/Soc


Hx Cardiac Disorders:  Yes (htn)


Hx Psychiatric Problems:  Yes (depression)


Hx Miscellaneous Medical Probl:  Yes (DM, dyslipidemia )


Smoking Status:  Unknown if ever smoked





Physical Exam


Vitals





Vital Signs








  Date Time  Temp Pulse Resp B/P Pulse Ox O2 Delivery O2 Flow Rate FiO2


 


10/29/17 03:00      Nasal Cannula 2.0 


 


10/29/17 02:10  111 24  93   21


 


10/29/17 01:34  112   100   60


 


10/29/17 01:08 96.5 116 36 141/102 100   


 


10/29/17 01:04       15 








Physical Exam


Const:  []          Severe respiratory distress


Head:   Atraumatic 


Eyes:    Normal Conjunctiva


ENT:    Normal External Ears, Nose and Mouth.  Patient spitting frothy pink 

sputum


Neck:               Full range of motion..~ No meningismus.


Resp:   Tachypnea with accessory muscle use and decreased breath sounds in 

lungs with pronounced coarse rales at every listening post including anteriorly.


Cardio:    Regular tachycardia no murmurs


Abd:    Soft, non tender, non distended. Normal bowel sounds


Skin:    No petechiae or rashes


Back:    No midline or flank tenderness


Ext:    No cyanosis, or edema


Neur:    Awake and alert, answering some questions, full neuro exam not 

possible on arrival.  No exam was repeated after BiPAP was discontinued and 

patient was alert and oriented 3 with no focal deficits.


Result Diagram:  


10/29/17 0101                                                                  

              10/29/17 0101





Results 24 hrs





 Laboratory Tests








Test


  10/29/17


01:01 10/29/17


01:35 10/29/17


03:38


 


White Blood Count 11.810^3/ul   


 


Red Blood Count 4.5110^6/ul   


 


Hemoglobin 14.1g/dl   


 


Hematocrit 44.7%   


 


Mean Corpuscular Volume 99.1fl   


 


Mean Corpuscular Hemoglobin 31.3pg   


 


Mean Corpuscular Hemoglobin


Concent 31.5g/dl 


  


  


 


 


Red Cell Distribution Width 13.3%   


 


Platelet Count 17754^3/UL   


 


Mean Platelet Volume 11.2fl   


 


Neutrophils % 47.1%   


 


Lymphocytes % 40.6%   


 


Monocytes % 6.1%   


 


Eosinophils % 5.3%   


 


Basophils % 0.7%   


 


Nucleated Red Blood Cells % 0.0/100WBC   


 


Neutrophils # 5.510^3/ul   


 


Lymphocytes # 4.810^3/ul   


 


Monocytes # 0.710^3/ul   


 


Eosinophils # 0.610^3/ul   


 


Basophils # 0.110^3/ul   


 


Nucleated Red Blood Cells # 0.010^3/ul   


 


Sodium Level 142mmol/L   


 


Potassium Level 3.5mmol/L   


 


Chloride Level 104mmol/L   


 


Carbon Dioxide Level 22mmol/L   


 


Anion Gap 20   


 


Blood Urea Nitrogen 24mg/dl   


 


Creatinine 1.41mg/dl   


 


Glucose Level 439mg/dl   


 


Calcium Level 9.3mg/dl   


 


Troponin I 0.047ng/ml   


 


B-Type Natriuretic Peptide 4590PG/ML   


 


Blood Gas Specimen Source  Blood arterial  


 


Arterial Blood Date Drawn


  


  10/29/2017


2:05:59 AM 


 


 


Arterial Blood pH (Temp


corrected) 


  7.343 


  


 


 


Arterial Blood pCO2 (Temp


correct) 


  38.3mmhg 


  


 


 


Arterial Blood pO2 (Temp


corrected) 


  186.8mmHG 


  


 


 


Arterial Blood HCO3  20.3mmol/L  


 


Arterial Blood Base Excess  -4.8mmol/L  


 


Arterial Blood Oxygen


Saturation 


  99.2mmHG 


  


 


 


Clemente Test  ACCEPTAB  


 


Arterial Blood Gas Puncture


Site 


  Right Radial 


  


 


 


Arterial Blood


Carboxyhemoglobin 


  0.7% 


  


 


 


Arterial Blood Methemoglobin  0.4%  


 


Blood Gas A-a O2 Differential  198.9mmHg  


 


Oxyhemoglobin Percent  98.1%  


 


Total Hemoglobin  14.5g/dl  


 


Blood Gas Temperature  37.0C  


 


Blood Gas Respiration Rate  20.0  


 


Blood Gas Actual Respiration


Rate 


  22 


  


 


 


Blood Gas Modality  MASK - BIPAP  


 


FiO2  60.0%  


 


Blood Gas Pressure Support  12  


 


Blood Gas IPAP/EPAP Ratio  18/6  


 


Blood Gas Notified Whom  UP  


 


Blood Gas Notified Time


  


  10/29/2017


2:22:27 AM 


 


 


Bedside Glucose   417mg/dL 








 Current Medications








 Medications


  (Trade)  Dose


 Ordered  Sig/Anabel


 Route


 PRN Reason  Start Time


 Stop Time Status Last Admin


Dose Admin


 


 Aspirin


  (Aspirin)  325 mg  ONCE  STAT


 PO


   10/29/17 01:04


 10/29/17 01:06 DC 10/29/17 02:11


 


 


 Albuterol


  (Proventil


 0.083% (Neb))  5 mg  ONCE  STAT


 HHN


   10/29/17 01:04


 10/29/17 01:06 DC 10/28/17 02:12


 


 


 Ipratropium


 Bromide


  (Atrovent 0.02%


  (Neb))  1 mg  ONCE  ONCE


 HHN


   10/29/17 01:30


 10/29/17 01:31 DC 10/29/17 02:12


 


 


 Ondansetron HCl


  (Zofran Inj)  4 mg  ONCE  STAT


 IV


   10/29/17 01:04


 10/29/17 01:06 DC 10/29/17 01:36


 


 


 Furosemide


  (Lasix)  40 mg  ONCE  ONCE


 IV


   10/29/17 02:00


 10/29/17 02:01 DC 10/29/17 02:15


 


 


 Nitroglycerin


  (Nitroglycerin


  (Sl Tab) 0.4 Mg)  1 tab  ONCE  ONCE


 SL


   10/29/17 02:00


 10/29/17 02:01 DC 10/29/17 02:15


 


 


 Insulin Human


 Lispro


  (Humalog)  8 unit  ONCE  STAT


 SC


   10/29/17 03:00


 10/29/17 03:12 DC 10/29/17 03:45


 











Procedures/MDM


Respiratory failure secondary to CHF and likely flash pulmonary edema.  

Elevated BNP and diffuse pulmonary edema on chest x-ray.  Patient immediately 

placed on BiPAP and given Zofran.  So given aspirin and nitroglycerin.  4o mg 

of IV Lasix given.  Patient's condition improved on BiPAP and with medication 

was able to switch to nonrebreather mask.  No signs of ischemia and EKG. 8 mg 

of subcutaneous lispro insulin given for diabetic hyperglycemia.  Spoke with 

[facility]  who would like patient to be transferred.  [facility] [place] because his closer 

although he preferred to stay in this hospital.  For insurance reasons he will 

be transferred according to [facility] protocol.  His critical status has 

transitioned to guarded and I believe is appropriate for ALS.  Is being tried 

on 4 L nasal cannula now and is still saturating well.  








EEG interpretation: Sinus tachycardia 114, bifascicular block, indeterminate 

axis, no ST or T-wave changes concerning for acute ischemia.


Cardiac monitor interpretation: Sinus tachycardia followed by normal sinus 

rhythm.  No arrhythmias


Chest x-ray interpretation: Diffuse pulmonary edema, see no obvious infiltrates

, no fractures, no pneumothorax.       Of note, radiologist mentions bilateral 

pneumonia however given the clinical setting this is highly unlikely and the x-

ray does have appearance of pulmonary edema rather than pneumonia.





Critical care time 35 minutes: This includes treatment of respiratory failure 

with need for BiPAP, consideration of nitroglycerin drip, use of nitroglycerin 

tablets, Lasix, multiple space bedside to reassess status, treatment of 

hyperglycemia, stabilization of unstable vital signs, review of chart, 

discussion with patient and [facility] doctor.  This does not include any billable 

procedures.





Departure


Diagnosis:  


 Primary Impression:  


 Respiratory failure


 Additional Impressions:  


 Flash pulmonary edema


 Congestive heart failure


 Hyperglycemia due to type 2 diabetes mellitus


Condition:  Serious











ESTEPHANIA YARBROUGH DO Oct 29, 2017 04:32

## 2018-01-14 ENCOUNTER — HOSPITAL ENCOUNTER (EMERGENCY)
Age: 78
LOS: 1 days | Discharge: ANOTHER HOSPITAL | End: 2018-01-15

## 2018-01-14 ENCOUNTER — HOSPITAL ENCOUNTER (EMERGENCY)
Dept: HOSPITAL 91 - E/R | Age: 78
LOS: 1 days | Discharge: ANOTHER HOSPITAL | End: 2018-01-15
Payer: COMMERCIAL

## 2018-01-14 DIAGNOSIS — E11.9: ICD-10-CM

## 2018-01-14 DIAGNOSIS — J81.0: Primary | ICD-10-CM

## 2018-01-14 DIAGNOSIS — Z79.82: ICD-10-CM

## 2018-01-14 DIAGNOSIS — I10: ICD-10-CM

## 2018-01-14 LAB
ADD MAN DIFF?: NO
BASOPHIL #: 0.1 10^3/UL (ref 0–0.1)
BASOPHILS %: 0.6 % (ref 0–2)
EOSINOPHILS #: 0.4 10^3/UL (ref 0–0.5)
EOSINOPHILS %: 4.3 % (ref 0–7)
HEMATOCRIT: 41.8 % (ref 42–52)
HEMOGLOBIN: 13.9 G/DL (ref 14–18)
LYMPHOCYTES #: 3.2 10^3/UL (ref 0.8–2.9)
LYMPHOCYTES %: 32.1 % (ref 15–51)
MEAN CORPUSCULAR HEMOGLOBIN: 32.2 PG (ref 29–33)
MEAN CORPUSCULAR HGB CONC: 33.3 G/DL (ref 32–37)
MEAN CORPUSCULAR VOLUME: 96.8 FL (ref 82–101)
MEAN PLATELET VOLUME: 10.8 FL (ref 7.4–10.4)
MONOCYTE #: 0.7 10^3/UL (ref 0.3–0.9)
MONOCYTES %: 6.8 % (ref 0–11)
NEUTROPHIL #: 5.6 10^3/UL (ref 1.6–7.5)
NEUTROPHILS %: 55.9 % (ref 39–77)
NUCLEATED RED BLOOD CELLS #: 0 10^3/UL (ref 0–0)
NUCLEATED RED BLOOD CELLS%: 0 /100WBC (ref 0–0)
PLATELET COUNT: 272 10^3/UL (ref 140–415)
RED BLOOD COUNT: 4.32 10^6/UL (ref 4.7–6.1)
RED CELL DISTRIBUTION WIDTH: 12.4 % (ref 11.5–14.5)
WHITE BLOOD COUNT: 10 10^3/UL (ref 4.8–10.8)

## 2018-01-14 PROCEDURE — 71045 X-RAY EXAM CHEST 1 VIEW: CPT

## 2018-01-14 PROCEDURE — 82803 BLOOD GASES ANY COMBINATION: CPT

## 2018-01-14 PROCEDURE — 84484 ASSAY OF TROPONIN QUANT: CPT

## 2018-01-14 PROCEDURE — 94660 CPAP INITIATION&MGMT: CPT

## 2018-01-14 PROCEDURE — 96374 THER/PROPH/DIAG INJ IV PUSH: CPT

## 2018-01-14 PROCEDURE — 85610 PROTHROMBIN TIME: CPT

## 2018-01-14 PROCEDURE — 85025 COMPLETE CBC W/AUTO DIFF WBC: CPT

## 2018-01-14 PROCEDURE — 83880 ASSAY OF NATRIURETIC PEPTIDE: CPT

## 2018-01-14 PROCEDURE — 80053 COMPREHEN METABOLIC PANEL: CPT

## 2018-01-14 PROCEDURE — 99291 CRITICAL CARE FIRST HOUR: CPT

## 2018-01-14 PROCEDURE — 93005 ELECTROCARDIOGRAM TRACING: CPT

## 2018-01-14 PROCEDURE — 85730 THROMBOPLASTIN TIME PARTIAL: CPT

## 2018-01-14 PROCEDURE — 36415 COLL VENOUS BLD VENIPUNCTURE: CPT

## 2018-01-14 PROCEDURE — 36600 WITHDRAWAL OF ARTERIAL BLOOD: CPT

## 2018-01-14 RX ADMIN — NITROGLYCERIN 1 INCH: 20 OINTMENT TOPICAL at 23:31

## 2018-01-14 RX ADMIN — FUROSEMIDE 1 MG: 10 INJECTION, SOLUTION INTRAVENOUS at 23:31

## 2018-01-15 LAB
ALANINE AMINOTRANSFERASE: 27 IU/L (ref 13–69)
ALBUMIN/GLOBULIN RATIO: 1.2
ALBUMIN: 4.2 G/DL (ref 3.3–4.9)
ALKALINE PHOSPHATASE: 98 IU/L (ref 42–121)
ALLEN TEST: (no result)
ANION GAP: 14 (ref 8–16)
ARTERIAL BASE EXCESS: -3.6 MMOL/L (ref -3–3)
ARTERIAL BLOOD GAS OXYGEN SAT: 98.9 MMHG (ref 95–100)
ARTERIAL COHB: 0.3 % (ref 0–3)
ARTERIAL FRACTION OF OXYHGB: 98.4 % (ref 93–99)
ARTERIAL HCO3: 20.9 MMOL/L (ref 22–26)
ARTERIAL METHB: 0.2 % (ref 0–1.5)
ARTERIAL PCO2: 36.3 MMHG (ref 35–45)
ARTERIAL TOTAL HEMGLOBIN: 13.9 G/DL (ref 12–18)
ASPARTATE AMINO TRANSFERASE: 21 IU/L (ref 15–46)
B-TYPE NATRIURETIC PEPTIDE: 2870 PG/ML (ref 0–450)
BILIRUBIN,DIRECT: 0 MG/DL (ref 0–0.2)
BILIRUBIN,TOTAL: 0.1 MG/DL (ref 0.2–1.3)
BLOOD GAS IEPAP: (no result)
BLOOD UREA NITROGEN: 17 MG/DL (ref 7–20)
CALCIUM: 9 MG/DL (ref 8.4–10.2)
CARBON DIOXIDE: 25 MMOL/L (ref 21–31)
CHLORIDE: 105 MMOL/L (ref 97–110)
CREATININE: 1.09 MG/DL (ref 0.61–1.24)
FIO2: 50 %
GLOBULIN: 3.5 G/DL (ref 1.3–3.2)
GLUCOSE: 291 MG/DL (ref 70–220)
INR: 0.98
MODE: (no result)
O2 A-A PPRESDIFF RESPIRATORY: 135.2 MMHG (ref 7–24)
PARTIAL THROMBOPLASTIN TIME: 25.7 SEC (ref 25–35)
POTASSIUM: 3.3 MMOL/L (ref 3.5–5.1)
PROTIME: 13.1 SEC (ref 11.9–14.9)
PT RATIO: 1
SODIUM: 141 MMOL/L (ref 135–144)
TOTAL PROTEIN: 7.7 G/DL (ref 6.1–8.1)
TROPONIN-I: 0.04 NG/ML (ref 0–0.12)